# Patient Record
Sex: FEMALE | HISPANIC OR LATINO | URBAN - METROPOLITAN AREA
[De-identification: names, ages, dates, MRNs, and addresses within clinical notes are randomized per-mention and may not be internally consistent; named-entity substitution may affect disease eponyms.]

---

## 2019-02-27 ENCOUNTER — OFFICE VISIT (OUTPATIENT)
Dept: URBAN - METROPOLITAN AREA CLINIC 11 | Facility: CLINIC | Age: 64
End: 2019-02-27
Payer: MEDICAID

## 2019-02-27 PROCEDURE — 92133 CPTRZD OPH DX IMG PST SGM ON: CPT | Performed by: OPHTHALMOLOGY

## 2019-02-27 PROCEDURE — 99213 OFFICE O/P EST LOW 20 MIN: CPT | Performed by: OPHTHALMOLOGY

## 2019-02-27 RX ORDER — TIMOLOL MALEATE 5 MG/ML
0.5 % SOLUTION/ DROPS OPHTHALMIC
Qty: 1 | Refills: 11 | Status: INACTIVE
Start: 2019-02-27 | End: 2019-08-28

## 2019-02-27 ASSESSMENT — INTRAOCULAR PRESSURE
OD: 21
OS: 16

## 2019-02-27 NOTE — IMPRESSION/PLAN
Impression: Chronic angle-closure glaucoma, right eye, severe stage: J49.1696. S/p Trab OD 2017, 2/18 HVF OS S/IA, 2/19 OCT 54/90 7/8 Plan: IOP elevated OD. Recommend patient continue Lumigan QHS OU. Start Timolol QAM OD (medication sent to pharmacy). Will monitor closely. 2 month HVF and IOP check with Dr Kenia Hylton.

## 2019-05-03 ENCOUNTER — TESTING ONLY (OUTPATIENT)
Dept: URBAN - METROPOLITAN AREA CLINIC 11 | Facility: CLINIC | Age: 64
End: 2019-05-03
Payer: MEDICAID

## 2019-05-03 PROCEDURE — 92083 EXTENDED VISUAL FIELD XM: CPT | Performed by: OPTOMETRIST

## 2019-05-07 ENCOUNTER — OFFICE VISIT (OUTPATIENT)
Dept: URBAN - METROPOLITAN AREA CLINIC 11 | Facility: CLINIC | Age: 64
End: 2019-05-07
Payer: MEDICAID

## 2019-05-07 PROCEDURE — 99213 OFFICE O/P EST LOW 20 MIN: CPT | Performed by: OPTOMETRIST

## 2019-05-07 ASSESSMENT — INTRAOCULAR PRESSURE
OS: 18
OD: 22

## 2019-05-07 NOTE — IMPRESSION/PLAN
Impression: Chronic angle-closure glaucoma, right eye, severe stage: F45.5867. S/p Trab OD 2017, 5/19 HVF OS S/IA, 2/19 OCT 54/90 7/8 Plan: IOP still elevated OD. Recommend patient continue Lumigan QHS OU. Increase Timolol to bid OD. Will monitor closely. 2 month HVF and IOP check with Dr Yolanda Zhong.

## 2019-06-25 ENCOUNTER — OFFICE VISIT (OUTPATIENT)
Dept: URBAN - METROPOLITAN AREA CLINIC 11 | Facility: CLINIC | Age: 64
End: 2019-06-25
Payer: MEDICAID

## 2019-06-25 PROCEDURE — 99213 OFFICE O/P EST LOW 20 MIN: CPT | Performed by: OPHTHALMOLOGY

## 2019-06-25 ASSESSMENT — INTRAOCULAR PRESSURE
OD: 30
OS: 13

## 2019-06-26 NOTE — IMPRESSION/PLAN
Impression: Open angle with borderline findings, high risk, left eye: H40.022.  increased c/d, S/p LPI OS Plan: See #1

## 2019-06-26 NOTE — IMPRESSION/PLAN
Impression: Chronic angle-closure glaucoma, right eye, severe stage: M60.9162. S/p Trab OD 2017, 5/19 HVF OD S/IA OS GD, 2/19 OCT 54/90 7/8 Plan: IOP remains elevated OD. HVF reviewed with patient. Discussed treatment options with patient. Recommend patient continue Lumigan QHS OS. D/C Timolol. Start Combigan BID OD and Rocklatan QHS OD (samples dispensed to patient) Discussed Micropulse as next step if IOP remains elevated. Will monitor closely. 1 month IOP check Dr Alexa Harmon.

## 2019-08-14 ENCOUNTER — OFFICE VISIT (OUTPATIENT)
Dept: URBAN - METROPOLITAN AREA CLINIC 11 | Facility: CLINIC | Age: 64
End: 2019-08-14
Payer: MEDICAID

## 2019-08-14 PROCEDURE — 99213 OFFICE O/P EST LOW 20 MIN: CPT | Performed by: OPHTHALMOLOGY

## 2019-08-14 RX ORDER — PREDNISOLONE ACETATE 10 MG/ML
1 % SUSPENSION/ DROPS OPHTHALMIC
Qty: 1 | Refills: 0 | Status: INACTIVE
Start: 2019-08-14 | End: 2019-08-28

## 2019-08-14 ASSESSMENT — INTRAOCULAR PRESSURE
OS: 12
OD: 25

## 2019-08-14 NOTE — IMPRESSION/PLAN
Impression: Chronic angle-closure glaucoma, right eye, severe stage: J93.5386. S/p Trab OD 2017, 5/19 HVF OD S/IA OS GD, 2/19 OCT 54/90 7/8 Plan: IOP improved but remains elevated OD. Discussed treatment options with patient. Recommend patient continue Lumigan QHS OS, Combigan BID OD and Rocklatan QHS OD. Discussed the option of the Micropulse G6 Laser procedure to help aid w/ Glaucoma. Recommend Micropulse cytophotocoagulation OD. R/B/A of procedure discussed. Pt desires to proceed with procedure.

## 2019-08-21 ENCOUNTER — SURGERY (OUTPATIENT)
Dept: URBAN - METROPOLITAN AREA SURGERY 20 | Facility: SURGERY | Age: 64
End: 2019-08-21
Payer: MEDICAID

## 2019-08-21 PROCEDURE — 66710 CILIARY TRANSSLERAL THERAPY: CPT | Performed by: OPHTHALMOLOGY

## 2019-08-28 ENCOUNTER — POST-OPERATIVE VISIT (OUTPATIENT)
Dept: URBAN - METROPOLITAN AREA CLINIC 11 | Facility: CLINIC | Age: 64
End: 2019-08-28

## 2019-08-28 PROCEDURE — 99024 POSTOP FOLLOW-UP VISIT: CPT | Performed by: OPHTHALMOLOGY

## 2019-08-28 RX ORDER — NETARSUDIL AND LATANOPROST OPHTHALMIC SOLUTION, 0.02%/0.005% .2; .05 MG/ML; MG/ML
SOLUTION/ DROPS OPHTHALMIC; TOPICAL
Qty: 2.5 | Refills: 4 | Status: INACTIVE
Start: 2019-08-28 | End: 2019-10-11

## 2019-08-28 RX ORDER — BIMATOPROST 0.1 MG/ML
0.01 % SOLUTION/ DROPS OPHTHALMIC
Qty: 3 | Refills: 3 | Status: INACTIVE
Start: 2019-08-28 | End: 2020-09-16

## 2019-08-28 RX ORDER — BRIMONIDINE TARTRATE, TIMOLOL MALEATE 2; 5 MG/ML; MG/ML
SOLUTION/ DROPS OPHTHALMIC
Qty: 15 | Refills: 3 | Status: INACTIVE
Start: 2019-08-28 | End: 2019-11-25

## 2019-08-28 ASSESSMENT — INTRAOCULAR PRESSURE
OS: 13
OD: 22

## 2019-10-04 ENCOUNTER — POST-OPERATIVE VISIT (OUTPATIENT)
Dept: URBAN - METROPOLITAN AREA CLINIC 11 | Facility: CLINIC | Age: 64
End: 2019-10-04
Payer: MEDICAID

## 2019-10-04 DIAGNOSIS — Z09 ENCNTR FOR F/U EXAM AFT TRTMT FOR COND OTH THAN MALIG NEOPLM: Primary | ICD-10-CM

## 2019-10-04 ASSESSMENT — INTRAOCULAR PRESSURE
OS: 14
OD: 22

## 2019-12-04 ENCOUNTER — OFFICE VISIT (OUTPATIENT)
Dept: URBAN - METROPOLITAN AREA CLINIC 11 | Facility: CLINIC | Age: 64
End: 2019-12-04
Payer: MEDICAID

## 2019-12-04 DIAGNOSIS — H25.13 AGE-RELATED NUCLEAR CATARACT, BILATERAL: ICD-10-CM

## 2019-12-04 PROCEDURE — 92012 INTRM OPH EXAM EST PATIENT: CPT | Performed by: OPHTHALMOLOGY

## 2019-12-04 RX ORDER — OFLOXACIN 3 MG/ML
0.3 % SOLUTION/ DROPS OPHTHALMIC
Qty: 5 | Refills: 1 | Status: INACTIVE
Start: 2019-12-04 | End: 2020-01-10

## 2019-12-04 RX ORDER — LOTEPREDNOL ETABONATE 3.8 MG/G
0.38 % GEL OPHTHALMIC
Qty: 1 | Refills: 2 | Status: INACTIVE
Start: 2019-12-04 | End: 2019-12-16

## 2019-12-04 ASSESSMENT — INTRAOCULAR PRESSURE
OS: 12
OD: 24

## 2019-12-04 NOTE — IMPRESSION/PLAN
Impression: Age-related nuclear cataract, bilateral: H25.13. Plan: OK for ce/iol OD w/ OMNI in [place], RL2. Distance target. Discussed lens options, Toric/Trifocal. Pt is NOT a candidate for premium lens. Discussed need for glasses for near vision with standard IOL and possibly Trifocal as well. Discussed diagnosis of cataracts. Cataracts are limiting vision. Discussed risks, benefits and alternatives to surgery including but not limited to: bleeding, infection, risk of vision loss, loss of the eye, need for other surgery. Patient voiced understanding and wishes to proceed. Wet prep negative for bacterial vaginosis, yeast, and trichomonas.   GC/chlamydia pending.   Advise no anitibiotics at this time; soak in plain water bath then air dry; NO douching.  Expect vaginal discharge and vaginal bacterial to return to normal over time.   CBC - normal.

## 2019-12-04 NOTE — IMPRESSION/PLAN
Impression: Chronic angle-closure glaucoma, right eye, severe stage: B28.3010. S/p Trab OD 2017, 5/19 HVF OD S/IA OS GD, 2/19 OCT 54/90 7/8 Plan: IOP elevated OD. Discussed treatment options with patient. Recommend patient continue Lumigan QHS OS, Combigan BID OD and Rocklatan QHS OD. Recommend OMNI OD (if unable due to insurance coverage, second choice iStent). Discussed glaucoma and cataracts. Discussed risks, benefits and alternatives to MIGS procedure. Pt voiced understanding and desires to proceed.

## 2019-12-05 ENCOUNTER — PRE-OPERATIVE VISIT (OUTPATIENT)
Dept: URBAN - METROPOLITAN AREA CLINIC 11 | Facility: CLINIC | Age: 64
End: 2019-12-05
Payer: MEDICAID

## 2019-12-05 PROCEDURE — 92136 OPHTHALMIC BIOMETRY: CPT | Performed by: OPHTHALMOLOGY

## 2019-12-05 ASSESSMENT — PACHYMETRY
OD: 22.88
OS: 2.68
OS: 22.72
OD: 2.47

## 2019-12-09 ENCOUNTER — SURGERY (OUTPATIENT)
Dept: URBAN - METROPOLITAN AREA SURGERY 15 | Facility: SURGERY | Age: 64
End: 2019-12-09
Payer: MEDICAID

## 2019-12-09 PROCEDURE — 66174 TRLUML DIL AQ O/F CAN W/O ST: CPT | Performed by: OPHTHALMOLOGY

## 2019-12-10 ENCOUNTER — POST-OPERATIVE VISIT (OUTPATIENT)
Dept: URBAN - METROPOLITAN AREA CLINIC 11 | Facility: CLINIC | Age: 64
End: 2019-12-10

## 2019-12-10 PROCEDURE — 99024 POSTOP FOLLOW-UP VISIT: CPT | Performed by: OPTOMETRIST

## 2019-12-10 ASSESSMENT — INTRAOCULAR PRESSURE
OS: 14
OD: 15

## 2019-12-16 ENCOUNTER — POST-OPERATIVE VISIT (OUTPATIENT)
Dept: URBAN - METROPOLITAN AREA CLINIC 11 | Facility: CLINIC | Age: 64
End: 2019-12-16

## 2019-12-16 PROCEDURE — 99024 POSTOP FOLLOW-UP VISIT: CPT | Performed by: OPTOMETRIST

## 2019-12-16 RX ORDER — LOTEPREDNOL ETABONATE 3.8 MG/G
0.38 % GEL OPHTHALMIC
Qty: 1 | Refills: 1 | Status: INACTIVE
Start: 2019-12-16 | End: 2020-01-10

## 2019-12-16 ASSESSMENT — INTRAOCULAR PRESSURE
OD: 18
OS: 18

## 2020-01-10 ENCOUNTER — POST-OPERATIVE VISIT (OUTPATIENT)
Dept: URBAN - METROPOLITAN AREA CLINIC 11 | Facility: CLINIC | Age: 65
End: 2020-01-10

## 2020-01-10 PROCEDURE — 99024 POSTOP FOLLOW-UP VISIT: CPT | Performed by: OPTOMETRIST

## 2020-01-10 RX ORDER — BRIMONIDINE TARTRATE, TIMOLOL MALEATE 2; 5 MG/ML; MG/ML
SOLUTION/ DROPS OPHTHALMIC
Qty: 1 | Refills: 0 | Status: INACTIVE
Start: 2020-01-10 | End: 2020-09-16

## 2020-01-10 ASSESSMENT — INTRAOCULAR PRESSURE
OS: 16
OD: 29
OD: 33

## 2020-01-13 ENCOUNTER — POST-OPERATIVE VISIT (OUTPATIENT)
Dept: URBAN - METROPOLITAN AREA CLINIC 11 | Facility: CLINIC | Age: 65
End: 2020-01-13
Payer: MEDICAID

## 2020-01-13 PROCEDURE — 99024 POSTOP FOLLOW-UP VISIT: CPT | Performed by: OPTOMETRIST

## 2020-01-13 ASSESSMENT — INTRAOCULAR PRESSURE
OD: 18
OS: 13

## 2020-09-15 ENCOUNTER — OFFICE VISIT (OUTPATIENT)
Dept: URBAN - METROPOLITAN AREA CLINIC 11 | Facility: CLINIC | Age: 65
End: 2020-09-15
Payer: MEDICAID

## 2020-09-15 DIAGNOSIS — H40.022 OPEN ANGLE WITH BORDERLINE FINDINGS, HIGH RISK, LEFT EYE: ICD-10-CM

## 2020-09-15 DIAGNOSIS — H40.2213 CHRONIC ANGLE-CLOSURE GLAUCOMA, RIGHT EYE, SEVERE STAGE: Primary | ICD-10-CM

## 2020-09-15 PROCEDURE — 99213 OFFICE O/P EST LOW 20 MIN: CPT | Performed by: OPHTHALMOLOGY

## 2020-09-15 ASSESSMENT — INTRAOCULAR PRESSURE
OS: 15
OD: 14

## 2020-09-15 NOTE — IMPRESSION/PLAN
Impression: Chronic angle-closure glaucoma, right eye, severe stage: Y73.5154. S/p Trab OD 2017, 5/19 HVF OD S/IA OS GD, 2/19 OCT 54/90 7/8 Plan: Discussed diagnosis with patient. Recommend patient continue Combigan BID OD and Lumigan QHS OS. Will continue to monitor. 4-5 month HVF/Optos and DE.   [id number] [address] for tech to check IOP and Dilate prior to seeing MD.

## 2021-02-19 ENCOUNTER — TESTING ONLY (OUTPATIENT)
Dept: URBAN - METROPOLITAN AREA CLINIC 11 | Facility: CLINIC | Age: 66
End: 2021-02-19
Payer: MEDICAID

## 2021-02-19 PROCEDURE — 92083 EXTENDED VISUAL FIELD XM: CPT | Performed by: OPHTHALMOLOGY

## 2021-02-19 RX ORDER — BIMATOPROST 0.1 MG/ML
0.01 % SOLUTION/ DROPS OPHTHALMIC
Qty: 3 | Refills: 3 | Status: INACTIVE
Start: 2021-02-19 | End: 2021-08-24

## 2021-02-24 ENCOUNTER — OFFICE VISIT (OUTPATIENT)
Dept: URBAN - METROPOLITAN AREA CLINIC 11 | Facility: CLINIC | Age: 66
End: 2021-02-24
Payer: MEDICAID

## 2021-02-24 DIAGNOSIS — H04.123 DRY EYE SYNDROME OF BILATERAL LACRIMAL GLANDS: ICD-10-CM

## 2021-02-24 DIAGNOSIS — H25.12 AGE-RELATED NUCLEAR CATARACT, LEFT EYE: ICD-10-CM

## 2021-02-24 PROCEDURE — 92014 COMPRE OPH EXAM EST PT 1/>: CPT | Performed by: OPHTHALMOLOGY

## 2021-02-24 ASSESSMENT — INTRAOCULAR PRESSURE
OD: 12
OS: 14

## 2021-02-26 NOTE — IMPRESSION/PLAN
Impression: Chronic angle-closure glaucoma, right eye, severe stage: G66.3580. S/p Trab OD 2017, 2/21  HVF OD S/IA OS GD, 2/19 OCT 54/90 7/8, 2/21 Optos Plan: Discussed diagnosis with patient. HVF and Optos reviewed with patient. Recommend patient continue Combigan BID OD and Lumigan QHS OS. Will continue to monitor. 4 month IOP check.

## 2021-08-27 ENCOUNTER — OFFICE VISIT (OUTPATIENT)
Dept: URBAN - METROPOLITAN AREA CLINIC 11 | Facility: CLINIC | Age: 66
End: 2021-08-27
Payer: COMMERCIAL

## 2021-08-27 PROCEDURE — 92012 INTRM OPH EXAM EST PATIENT: CPT | Performed by: OPHTHALMOLOGY

## 2021-08-27 ASSESSMENT — INTRAOCULAR PRESSURE
OS: 15
OD: 13

## 2021-08-27 NOTE — IMPRESSION/PLAN
Impression: Chronic angle-closure glaucoma, right eye, severe stage: U41.3121. S/p Trab OD 2017, 2/21  HVF OD S/IA OS GD, 2/19 OCT 54/90 7/8, 2/21 Optos Plan: Discussed diagnosis with patient. IOP stable. Recommend patient continue Combigan BID OD and Lumigan QHS OS. Will continue to monitor. 4 month IOP check.

## 2022-02-08 ENCOUNTER — TESTING ONLY (OUTPATIENT)
Dept: URBAN - METROPOLITAN AREA CLINIC 11 | Facility: CLINIC | Age: 67
End: 2022-02-08
Payer: COMMERCIAL

## 2022-02-08 PROCEDURE — 92083 EXTENDED VISUAL FIELD XM: CPT | Performed by: OPHTHALMOLOGY

## 2022-02-09 ENCOUNTER — OFFICE VISIT (OUTPATIENT)
Dept: URBAN - METROPOLITAN AREA CLINIC 11 | Facility: CLINIC | Age: 67
End: 2022-02-09
Payer: COMMERCIAL

## 2022-02-09 PROCEDURE — 92014 COMPRE OPH EXAM EST PT 1/>: CPT | Performed by: OPHTHALMOLOGY

## 2022-02-09 ASSESSMENT — INTRAOCULAR PRESSURE
OS: 14
OD: 13

## 2022-02-09 NOTE — IMPRESSION/PLAN
Impression: Chronic angle-closure glaucoma, right eye, severe stage: Y19.6581. S/p Trab OD 2017, 2/22  HVF OD GD OS S/IA (stable), 2/19 OCT 54/90 7/8, 2/21 Optos Plan: Discussed diagnosis with patient. HVF reviewed with patient. IOP stable. Recommend patient continue Latanoprost  QHS OS. Hold on Combigan for now. Will continue to monitor. 4 month IOP check.